# Patient Record
Sex: MALE
[De-identification: names, ages, dates, MRNs, and addresses within clinical notes are randomized per-mention and may not be internally consistent; named-entity substitution may affect disease eponyms.]

---

## 2018-07-15 ENCOUNTER — HOSPITAL ENCOUNTER (INPATIENT)
Dept: HOSPITAL 31 - C.ER | Age: 18
LOS: 2 days | Discharge: HOME | DRG: 883 | End: 2018-07-17
Attending: SURGERY | Admitting: SURGERY
Payer: COMMERCIAL

## 2018-07-15 VITALS — BODY MASS INDEX: 31.8 KG/M2

## 2018-07-15 DIAGNOSIS — K35.80: Primary | ICD-10-CM

## 2018-07-15 DIAGNOSIS — J45.909: ICD-10-CM

## 2018-07-15 LAB
BASOPHILS # BLD AUTO: 0 K/UL (ref 0–0.2)
BASOPHILS NFR BLD: 0.3 % (ref 0–2)
BILIRUB UR-MCNC: NEGATIVE MG/DL
BUN SERPL-MCNC: 14 MG/DL (ref 9–20)
CALCIUM SERPL-MCNC: 9.4 MG/DL (ref 8.6–10.4)
EOSINOPHIL # BLD AUTO: 0.3 K/UL (ref 0–0.7)
EOSINOPHIL NFR BLD: 2.3 % (ref 0–4)
ERYTHROCYTE [DISTWIDTH] IN BLOOD BY AUTOMATED COUNT: 15.1 % (ref 11.5–14.5)
GFR NON-AFRICAN AMERICAN: > 60
GLUCOSE UR STRIP-MCNC: NORMAL MG/DL
HGB BLD-MCNC: 13.2 G/DL (ref 12–18)
LEUKOCYTE ESTERASE UR-ACNC: (no result) LEU/UL
LYMPHOCYTES # BLD AUTO: 2.2 K/UL (ref 1–4.3)
LYMPHOCYTES NFR BLD AUTO: 16.1 % (ref 20–40)
MCH RBC QN AUTO: 22 PG (ref 27–31)
MCHC RBC AUTO-ENTMCNC: 31.9 G/DL (ref 33–37)
MCV RBC AUTO: 68.9 FL (ref 80–94)
MONOCYTES # BLD: 0.9 K/UL (ref 0–0.8)
MONOCYTES NFR BLD: 6.8 % (ref 0–10)
NEUTROPHILS # BLD: 10.3 K/UL (ref 1.8–7)
NEUTROPHILS NFR BLD AUTO: 74.5 % (ref 50–75)
NRBC BLD AUTO-RTO: 0.3 % (ref 0–2)
PH UR STRIP: 5 [PH] (ref 5–8)
PLATELET # BLD: 190 K/UL (ref 130–400)
PMV BLD AUTO: 8.4 FL (ref 7.2–11.7)
PROT UR STRIP-MCNC: NEGATIVE MG/DL
RBC # BLD AUTO: 6 MIL/UL (ref 4.4–5.9)
RBC # UR STRIP: NEGATIVE /UL
SP GR UR STRIP: 1.03 (ref 1–1.03)
SQUAMOUS EPITHIAL: < 1 /HPF (ref 0–5)
UROBILINOGEN UR-MCNC: NORMAL MG/DL (ref 0.2–1)
WBC # BLD AUTO: 13.8 K/UL (ref 4.8–10.8)

## 2018-07-15 NOTE — CP.PCM.HP
History of Present Illness





- History of Present Illness


History of Present Illness: 





18M with PMH of asthma presents to [facility] ED with complaints of abdominal pain. 

Patient states pain began in the morning after waking up. Patient describes 

initially abdominal pain was generalized but as time went on the pain began to 

localize towards RLQ. He states pain is sharp in nature and constant. Patient 

denies fever/chills, chest pain/SOB, n/v/d, dysuria.





PMH: as stated above


PSH: none


All: NKDA


Fam Hx: non-contributory   





Present on Admission





- Present on Admission


Any Indicators Present on Admission: No





Review of Systems





- Review of Systems


Review of Systems: 





12pt ROS unremarkable except as stated in HPI 





Past Patient History





- Past Social History


Smoking Status: Never Smoked





- PULMONARY


Hx Asthma: Yes





- PSYCHIATRIC


Hx Substance Use: No





Meds


Allergies/Adverse Reactions: 


 Allergies











Allergy/AdvReac Type Severity Reaction Status Date / Time


 


No Known Allergies Allergy   Verified 07/15/18 16:34














Physical Exam





- Constitutional


Appears: No Acute Distress





- Head Exam


Head Exam: NORMOCEPHALIC





- Eye Exam


Eye Exam: EOMI, Normal appearance





- ENT Exam


ENT Exam: Mucous Membranes Moist





- Respiratory Exam


Respiratory Exam: NORMAL BREATHING PATTERN





- Cardiovascular Exam


Cardiovascular Exam: +S1, +S2





- GI/Abdominal Exam


GI & Abdominal Exam: Rebound, Soft, Tenderness.  absent: Distended, Firm, 

Guarding, Rigid


Additional comments: 





+McBurney's


+RLQ tenderness


+rebound tenderness





- Neurological Exam


Neurological exam: Alert, Oriented x3





- Psychiatric Exam


Psychiatric exam: Normal Mood





- Skin


Skin Exam: Dry, Intact, Warm





Results





- Vital Signs


Recent Vital Signs: 





 Last Vital Signs











Temp  99.4 F   07/15/18 16:37


 


Pulse  64   07/15/18 22:04


 


Resp  12   07/15/18 22:04


 


BP  142/76   07/15/18 22:04


 


Pulse Ox  100   07/15/18 22:04














- Labs


Result Diagrams: 


 07/15/18 18:40





 07/15/18 18:40


Labs: 





 Laboratory Results - last 24 hr











  07/15/18 07/15/18 07/15/18





  18:40 18:40 18:40


 


WBC  13.8 H  


 


RBC  6.00 H  


 


Hgb  13.2  


 


Hct  41.3  


 


MCV  68.9 L  


 


MCH  22.0 L  


 


MCHC  31.9 L  


 


RDW  15.1 H  


 


Plt Count  190  


 


MPV  8.4  


 


Neut % (Auto)  74.5  


 


Lymph % (Auto)  16.1 L  


 


Mono % (Auto)  6.8  


 


Eos % (Auto)  2.3  


 


Baso % (Auto)  0.3  


 


Neut # (Auto)  10.3 H  


 


Lymph # (Auto)  2.2  


 


Mono # (Auto)  0.9 H  


 


Eos # (Auto)  0.3  


 


Baso # (Auto)  0.0  


 


Sodium    141


 


Potassium    4.0


 


Chloride    103


 


Carbon Dioxide    28


 


Anion Gap    14


 


BUN    14


 


Creatinine    0.8


 


Est GFR ( Amer)    > 60


 


Est GFR (Non-Af Amer)    > 60


 


Random Glucose    80


 


Calcium    9.4


 


Urine Color   Yellow 


 


Urine Clarity   Clear 


 


Urine pH   5.0 


 


Ur Specific Gravity   1.026 


 


Urine Protein   Negative 


 


Urine Glucose (UA)   Normal 


 


Urine Ketones   Negative 


 


Urine Blood   Negative 


 


Urine Nitrate   Negative 


 


Urine Bilirubin   Negative 


 


Urine Urobilinogen   Normal 


 


Ur Leukocyte Esterase   Neg 


 


Urine WBC (Auto)   1 


 


Urine RBC (Auto)   1 


 


Ur Squamous Epith Cells   < 1 














Assessment & Plan





- Assessment and Plan (Free Text)


Assessment: 





18M with acute appendicitis 


Plan: 





-NPO


-IVF


-ABx


-Anti-emetics/Analgesics


-SCDs


-For OR tomorrow for laparoscopic appendectomy


-Consent in chart


-F/u AM labs 


-D/w Dr. Elyssa Cristobal PGY3

## 2018-07-15 NOTE — C.PDOC
History Of Present Illness





<Nahomy Velasquez - Last Filed: 07/15/18 18:52>





<Rafywant,José Luis - Last Filed: 07/17/18 16:09>


18 year old male presents to the ED complaining of localized right lower pain 

since yesterday. Patient denies any fever, nausea, vomiting, diarrhea, or 

urinary symptoms. He denies any past surgical history.











RLQ PAIN SINCE YEST. LOCALIZED. NO FEVER NVD, UTI SX. PSH NEG





EXAM


NONTOXIC


ABD +RLQ TEND MILD SOFT NO R/G


REMAINDER NEG (Ron,Nahomy)


History Per: Patient


History/Exam Limitations: no limitations


Onset/Duration Of Symptoms: Days


Current Symptoms Are (Timing): Still Present


Location Of Pain/Discomfort: RLQ


Associated Symptoms: denies: Fever, Nausea, Vomiting, Diarrhea





<Nahomy Velasquez - Last Filed: 07/15/18 18:52>





<Rafyelis,José Luis - Last Filed: 07/17/18 16:09>


Time Seen by Provider: 07/15/18 17:55


Chief Complaint (Nursing): Abdominal Pain





Past Medical History


Reviewed: Historical Data, Nursing Documentation, Vital Signs





- Medical History


PMH: Asthma


Surgical History: No Surg Hx


Family History: States: No Known Family Hx





- Social History


Hx Alcohol Use: Yes


Hx Substance Use: No





- Immunization History


Hx Tetanus Toxoid Vaccination: No


Hx Influenza Vaccination: No


Hx Pneumococcal Vaccination: No





<Nahomy Velasquez - Last Filed: 07/15/18 18:52>


Vital Signs: 


 Last Vital Signs











Temp  98 F   07/17/18 07:30


 


Pulse  64   07/17/18 07:30


 


Resp  20   07/17/18 07:30


 


BP  122/78   07/17/18 07:30


 


Pulse Ox  97   07/17/18 07:30














Review Of Systems


Except As Marked, All Systems Reviewed And Found Negative.


Constitutional: Negative for: Fever, Chills


Gastrointestinal: Positive for: Abdominal Pain (RLQ ).  Negative for: Nausea, 

Vomiting, Diarrhea


Genitourinary: Negative for: Dysuria, Frequency, Incontinence, Hematuria





<Nahomy Velasquez - Last Filed: 07/15/18 18:52>





Physical Exam





- Physical Exam


Appears: Non-toxic, No Acute Distress


Skin: Normal Color, Warm, Dry


Head: Atraumatic, Normacephalic


Eye(s): bilateral: Normal Inspection


Nose: Normal


Oral Mucosa: Moist


Neck: Supple


Cardiovascular: Rhythm Regular


Respiratory: Other (NARD)


Gastrointestinal/Abdominal: Soft, Tenderness (+RLQ mild tenderness ), No 

Guarding, No Rebound





<Nahomy Velasquez - Last Filed: 07/15/18 18:52>





ED Course And Treatment


O2 Sat by Pulse Oximetry: 97 (RA)


Pulse Ox Interpretation: Normal


Progress Note: CT Abd/Pel ordered. Labs ordered. Patient given Morphine 2mg IVP

, Zofran 4mg IVP and Fluids. Urine collected and sent to the lab for analysis.





<Nahomy Velasquez - Last Filed: 07/15/18 18:52>





- Laboratory Results


Result Diagrams: 


 07/17/18 07:00





 07/17/18 07:00





- CT Scan/US


  ** Ct abd/pelvis


Other Rad Studies (CT/US): Read By Radiologist, Radiology Report Reviewed


CT/US Interpretation: EXAM:  CT Abdomen and Pelvis With Intravenous Contrast.  

CLINICAL HISTORY:  18 years old, male; Pain; Abdominal pain; Other: Rlq; 

Additional info: Abd pain rlq RO appy.  TECHNIQUE:  Axial computed tomography 

images of the abdomen and pelvis with intravenous contrast. All CT.  scans at 

this facility use at least one of these dose optimization techniques: automated 

exposure.  control; mA and/or kV adjustment per patient size (includes targeted 

exams where dose is matched to.  clinical indication); or iterative 

reconstruction.  CONTRAST:  100 mL of Visipaque 320 administered intravenously.

  COMPARISON:  No relevant prior studies available.  FINDINGS:  Lung bases: 

Unremarkable. No mass. No consolidation.  ABDOMEN:  Liver: Unremarkable. No 

mass.  Gallbladder and bile ducts: Unremarkable. No calcified stones. No ductal 

dilation. No significant.  wall thickening.  Pancreas: Unremarkable. No mass. 

No ductal dilation.  Spleen: Unremarkable. No splenomegaly.  Adrenals: 

Unremarkable. No mass.  Kidneys and ureters: Unremarkable. No solid mass. No 

hydronephrosis.  Stomach and bowel: Unremarkable. No obstruction. No mucosal 

thickening.  PELVIS:  Appendix: The appendix demonstrates mild diffuse 

distention, consistent with mild or early acute.  appendicitis. 10 mm appendix 

diameter. There is mild periappendiceal inflammatory stranding.  SLIME FRAGA 

Accession: [id number] MRN: [id number] | Preliminary Radiology Report.  

CONFIDENTIALITY STATEMENT.  This report is intended only for the use of the 

referring physician, and only in accordance with law, If you received this in 

error, call [phone].  Page 2 of 2.  Bladder: Unremarkable. No mass.  

Reproductive: Unremarkable as visualized.  ABDOMEN and PELVIS:  Intraperitoneal 

space: Unremarkable. No free air. No significant fluid collection.  Bones/joints

: No acute fracture. No dislocation.  Soft tissues: Unremarkable.  Vasculature: 

Unremarkable. No abdominal aortic aneurysm.  Lymph nodes: Unremarkable. No 

enlarged lymph nodes.  IMPRESSION:  Acute early appendicitis, without 

complications.  Thank you for allowing us to participate in the care of your 

patient.  Dictated and Authenticated by: Tang Pichardo MD.  07/15/2018 10:31 

PM Eastern Time ([place] & [place])





<José Luis Bowden - Last Filed: 07/17/18 16:09>





Medical Decision Making





<Nahomy Velasquez - Last Filed: 07/15/18 18:52>





<José Luis Bowden - Last Filed: 07/17/18 16:09>


Medical Decision Making: 





case endorsed pending ct ct shows appendcitis. accepted by [facility].  (José Luis Bowden)





Disposition


Counseled Patient/Family Regarding: Diagnosis





- Disposition


Disposition Time: 19:00





<Nahomy Velasquez - Last Filed: 07/15/18 18:52>





<José Luis Bowden - Last Filed: 07/17/18 16:09>





- Disposition


Disposition: HOSPITALIZED


Condition: GOOD





- Clinical Impression


Clinical Impression: 


 Abdominal pain, Acute appendicitis








Physician Patient Turnover


Patient Signed Over To: José Luis Bowden


Handoff Comments: FU LABS, CT, DISPO





<Nahomy Velasquez - Last Filed: 07/15/18 18:52>

## 2018-07-16 LAB
APTT BLD: 33 SECONDS (ref 21–34)
BASOPHILS # BLD AUTO: 0 K/UL (ref 0–0.2)
BASOPHILS NFR BLD: 0.2 % (ref 0–2)
BUN SERPL-MCNC: 12 MG/DL (ref 9–20)
CALCIUM SERPL-MCNC: 9 MG/DL (ref 8.6–10.4)
EOSINOPHIL # BLD AUTO: 0.4 K/UL (ref 0–0.7)
EOSINOPHIL NFR BLD: 4 % (ref 0–4)
ERYTHROCYTE [DISTWIDTH] IN BLOOD BY AUTOMATED COUNT: 15.1 % (ref 11.5–14.5)
GFR NON-AFRICAN AMERICAN: > 60
HGB BLD-MCNC: 13.1 G/DL (ref 12–18)
INR PPP: 1.1
LYMPHOCYTES # BLD AUTO: 2.4 K/UL (ref 1–4.3)
LYMPHOCYTES NFR BLD AUTO: 26.8 % (ref 20–40)
MCH RBC QN AUTO: 22.3 PG (ref 27–31)
MCHC RBC AUTO-ENTMCNC: 32.3 G/DL (ref 33–37)
MCV RBC AUTO: 69 FL (ref 80–94)
MONOCYTES # BLD: 0.7 K/UL (ref 0–0.8)
MONOCYTES NFR BLD: 7.6 % (ref 0–10)
NEUTROPHILS # BLD: 5.6 K/UL (ref 1.8–7)
NEUTROPHILS NFR BLD AUTO: 61.4 % (ref 50–75)
NRBC BLD AUTO-RTO: 0.1 % (ref 0–2)
PLATELET # BLD: 190 K/UL (ref 130–400)
PMV BLD AUTO: 8.3 FL (ref 7.2–11.7)
PROTHROMBIN TIME: 11.8 SECONDS (ref 9.7–12.2)
RBC # BLD AUTO: 5.89 MIL/UL (ref 4.4–5.9)
WBC # BLD AUTO: 9.1 K/UL (ref 4.8–10.8)

## 2018-07-16 PROCEDURE — 0DTJ4ZZ RESECTION OF APPENDIX, PERCUTANEOUS ENDOSCOPIC APPROACH: ICD-10-PCS | Performed by: SURGERY

## 2018-07-16 RX ADMIN — BUPIVACAINE HYDROCHLORIDE ONE ML: 2.5 INJECTION, SOLUTION INFILTRATION; PERINEURAL at 10:01

## 2018-07-16 RX ADMIN — LIDOCAINE HYDROCHLORIDE AND EPINEPHRINE ONE ML: 10; 10 INJECTION, SOLUTION INFILTRATION; PERINEURAL at 09:58

## 2018-07-16 RX ADMIN — BUPIVACAINE HYDROCHLORIDE ONE ML: 2.5 INJECTION, SOLUTION INFILTRATION; PERINEURAL at 09:58

## 2018-07-16 RX ADMIN — LIDOCAINE HYDROCHLORIDE AND EPINEPHRINE ONE ML: 10; 10 INJECTION, SOLUTION INFILTRATION; PERINEURAL at 10:01

## 2018-07-16 RX ADMIN — TAZOBACTAM SODIUM AND PIPERACILLIN SODIUM SCH MLS/HR: 375; 3 INJECTION, SOLUTION INTRAVENOUS at 03:38

## 2018-07-16 RX ADMIN — TAZOBACTAM SODIUM AND PIPERACILLIN SODIUM SCH MLS/HR: 375; 3 INJECTION, SOLUTION INTRAVENOUS at 16:46

## 2018-07-16 RX ADMIN — TAZOBACTAM SODIUM AND PIPERACILLIN SODIUM SCH: 375; 3 INJECTION, SOLUTION INTRAVENOUS at 09:19

## 2018-07-16 RX ADMIN — TAZOBACTAM SODIUM AND PIPERACILLIN SODIUM SCH MLS/HR: 375; 3 INJECTION, SOLUTION INTRAVENOUS at 21:37

## 2018-07-16 NOTE — RAD
Date of service: 



07/15/2018



HISTORY:

preop  



COMPARISON:

No prior.



TECHNIQUE:

Chest PA and lateral



FINDINGS:



LUNGS:

No active pulmonary disease.



PLEURA:

No significant pleural effusion identified. No pneumothorax apparent.



CARDIOVASCULAR:

Normal.



OSSEOUS STRUCTURES:

No significant abnormalities.



VISUALIZED UPPER ABDOMEN:

Normal.



OTHER FINDINGS:

None.



IMPRESSION:

No active disease.

## 2018-07-16 NOTE — OP
PROCEDURE DATE:  07/16/2018



PREOPERATIVE DIAGNOSIS:  Acute appendicitis.



POSTOPERATIVE DIAGNOSIS:  Acute appendicitis.



PROCEDURE DONE:  Laparoscopic appendectomy.



SURGEON:  Dilan Bergeron MD



ASSISTANT:  Chan Muñoz DO, PGY-2 resident.



ANESTHESIA:  General endotracheal tube anesthesia.



ESTIMATED BLOOD LOSS:  Around 10 mL.



DRAINS:  None.



PATHOLOGY:  The appendix was sent to the pathology.



COMPLICATIONS:  None.



INTRAOPERATIVE FINDINGS:  The patient had changes of acute appendicitis. 

There was no pelvic abscess.



DESCRIPTION OF PROCEDURE:  On intraoperative steps, this 18-year-old male

was diagnosed with acute appendicitis.  The patient presented for

laparoscopic appendectomy, possible open.  Brought to the OR, placed supine

on operating table.  After induction of the anesthesia, the abdomen was

prepped and draped in the usual sterile fashion.  A supraumbilical

transverse incision was made after incising the skin, subcutaneous tissue,

and the fascia.  The Vivien port was placed and pneumo was created.  A 5-mm

port was placed in the suprapubic region.  A 12-mm port was placed in the

left lower quadrant.  Grasper and dissector were introduced.  The appendix

appeared to be in the paracecal position.  The mesoappendix was detected. 

The base of appendix was resected with MORENA, and this was taken in an Endo

Cath bag, taken out through the umbilical port site and was sent off the

table for pathology.  There was a proper hemostasis in each and every part

of the procedure.  After proper suction and irrigation of the pelvis,

periappendicular area and perihepatic area, all the ports were taken out

under vision and pneumo was deflated.  The umbilical port site was closed

in two layers; the fascia with 0 Vicryl interrupted suture and skin with

4-0 Monocryl.  Dry sterile dressing was applied.  The patient tolerated the

procedure well.  Count of the instrument and gauze was correct.  There was

no apparent complication.  The patient was extubated in the OR and sent to

the postanesthesia care unit in stable condition.





__________________________________________

Dilan Bergeron MD





DD:  07/16/2018 11:15:52

DT:  07/16/2018 13:36:16

Job # [id number]

## 2018-07-16 NOTE — CT
Date of service: 



07/15/2018



PROCEDURE:  CT Abdomen and Pelvis without intravenous contrast



HISTORY:

Abdominal pain. 



COMPARISON:

None.



TECHNIQUE:

Multiple contiguous axial images were performed through the abdomen 

and pelvis without the use of intravenous contrast.  Subsequently, 

sagittal and coronal reformatted images were. 



Radiation dose:



Total exam DLP = 815 mGy-cm.



This CT exam was performed using one or more of the following dose 

reduction techniques: Automated exposure control, adjustment of the 

mA and/or kV according to patient size, and/or use of iterative 

reconstruction technique.



FINDINGS:



LOWER THORAX:

Unremarkable. 



LIVER:

Unremarkable. No gross lesion or ductal dilatation.  



GALLBLADDER AND BILE DUCTS:

Unremarkable. 



PANCREAS:

Unremarkable. No gross lesion or ductal dilatation.



SPLEEN:

Unremarkable. 



ADRENALS:

Unremarkable. No mass. 



KIDNEYS AND URETERS:

Unremarkable. No hydronephrosis. No solid mass. 



VASCULATURE:

Unremarkable. No aortic aneurysm. 



BOWEL:

Unremarkable. No obstruction. No gross mural thickening. 



APPENDIX:

Appendix demonstrates mild diffuse distention concerning for mild 

early acute appendicitis measuring up to 10 millimeters diameter.  

Mild periappendiceal fat stranding. 



PERITONEUM:

Unremarkable. No free fluid. No free air. 



LYMPH NODES:

Unremarkable. No enlarged lymph nodes. 



BLADDER:

Unremarkable. 



REPRODUCTIVE:

Unremarkable. 



BONES:

Sclerosis of the bilateral SI joints.  Punctate sclerotic foci within 

the right iliac bone.  Multilevel endplate sclerosis within the 

lumbar spine. Small sclerotic foci in the proximal right femur. 

Clinical correlation. 



OTHER FINDINGS:

None.



IMPRESSION:

Findings concerning for acute early appendicitis.  Clinical 

correlation.



Sclerosis of the bilateral SI joints.  Punctate sclerotic foci within 

the right iliac bone.  Multilevel endplate sclerosis within the 

lumbar spine. Small sclerotic foci in the proximal right femur. 

Clinical correlation. 



These findings were preliminarily reported at 10:31 p.m. 07/15/2018 

by Dr. Tang Pichardo from [facility].

## 2018-07-16 NOTE — PCM.SURG1
Surgeon's Initial Post Op Note





- Surgeon's Notes


Surgeon: Dr. Bergeron


Assistant: Wanda PGY2


Type of Anesthesia: General Endo


Anesthesia Administered By: Dr. Verde


Pre-Operative Diagnosis: Acute appendicitis


Operative Findings: Acute appendicitis


Post-Operative Diagnosis: Acute appendicitis


Operation Performed: Laparoscopic Appendectomy


Specimen/Specimens Removed: Appendix


Estimated Blood Loss: EBL {In ML}: 10


Blood Products Given: N/A


Drains Used: No Drains


Post-Op Condition: Good


Date of Surgery/Procedure: 07/16/18


Time of Surgery/Procedure: 11:27

## 2018-07-17 VITALS
SYSTOLIC BLOOD PRESSURE: 122 MMHG | TEMPERATURE: 98 F | OXYGEN SATURATION: 97 % | HEART RATE: 64 BPM | DIASTOLIC BLOOD PRESSURE: 78 MMHG

## 2018-07-17 VITALS — RESPIRATION RATE: 20 BRPM

## 2018-07-17 LAB
BASOPHILS # BLD AUTO: 0 K/UL (ref 0–0.2)
BASOPHILS NFR BLD: 0.2 % (ref 0–2)
BUN SERPL-MCNC: 9 MG/DL (ref 9–20)
CALCIUM SERPL-MCNC: 8.9 MG/DL (ref 8.6–10.4)
EOSINOPHIL # BLD AUTO: 0.3 K/UL (ref 0–0.7)
EOSINOPHIL NFR BLD: 3 % (ref 0–4)
ERYTHROCYTE [DISTWIDTH] IN BLOOD BY AUTOMATED COUNT: 15.2 % (ref 11.5–14.5)
GFR NON-AFRICAN AMERICAN: > 60
HGB BLD-MCNC: 13 G/DL (ref 12–18)
LYMPHOCYTES # BLD AUTO: 2.1 K/UL (ref 1–4.3)
LYMPHOCYTES NFR BLD AUTO: 24.2 % (ref 20–40)
MCH RBC QN AUTO: 22.6 PG (ref 27–31)
MCHC RBC AUTO-ENTMCNC: 32.6 G/DL (ref 33–37)
MCV RBC AUTO: 69.5 FL (ref 80–94)
MONOCYTES # BLD: 0.6 K/UL (ref 0–0.8)
MONOCYTES NFR BLD: 6.8 % (ref 0–10)
NEUTROPHILS # BLD: 5.8 K/UL (ref 1.8–7)
NEUTROPHILS NFR BLD AUTO: 65.8 % (ref 50–75)
NRBC BLD AUTO-RTO: 0 % (ref 0–2)
PLATELET # BLD: 195 K/UL (ref 130–400)
PMV BLD AUTO: 8.4 FL (ref 7.2–11.7)
RBC # BLD AUTO: 5.75 MIL/UL (ref 4.4–5.9)
WBC # BLD AUTO: 8.8 K/UL (ref 4.8–10.8)

## 2018-07-17 RX ADMIN — TAZOBACTAM SODIUM AND PIPERACILLIN SODIUM SCH MLS/HR: 375; 3 INJECTION, SOLUTION INTRAVENOUS at 09:20

## 2018-07-17 RX ADMIN — TAZOBACTAM SODIUM AND PIPERACILLIN SODIUM SCH MLS/HR: 375; 3 INJECTION, SOLUTION INTRAVENOUS at 03:33

## 2018-07-17 NOTE — CP.PCM.DIS
Provider





- Provider


Date of Admission: 


07/15/18 22:43





Attending physician: 


Dilan Bergeron MD





Time Spent in preparation of Discharge (in minutes): 40





Diagnosis





- Discharge Diagnosis


(1) Acute appendicitis


Status: Acute   





Hospital Course





- Lab Results


Lab Results: 


 Most Recent Lab Values











WBC  9.1 K/uL (4.8-10.8)   07/16/18  06:43    


 


RBC  5.89 Mil/uL (4.40-5.90)   07/16/18  06:43    


 


Hgb  13.1 g/dL (12.0-18.0)   07/16/18  06:43    


 


Hct  40.6 % (35.0-51.0)   07/16/18  06:43    


 


MCV  69.0 fL (80.0-94.0)  L  07/16/18  06:43    


 


MCH  22.3 pg (27.0-31.0)  L  07/16/18  06:43    


 


MCHC  32.3 g/dL (33.0-37.0)  L  07/16/18  06:43    


 


RDW  15.1 % (11.5-14.5)  H  07/16/18  06:43    


 


Plt Count  190 K/uL (130-400)   07/16/18  06:43    


 


MPV  8.3 fL (7.2-11.7)   07/16/18  06:43    


 


Neut % (Auto)  61.4 % (50.0-75.0)   07/16/18  06:43    


 


Lymph % (Auto)  26.8 % (20.0-40.0)   07/16/18  06:43    


 


Mono % (Auto)  7.6 % (0.0-10.0)   07/16/18  06:43    


 


Eos % (Auto)  4.0 % (0.0-4.0)   07/16/18  06:43    


 


Baso % (Auto)  0.2 % (0.0-2.0)   07/16/18  06:43    


 


Neut # (Auto)  5.6 K/uL (1.8-7.0)   07/16/18  06:43    


 


Lymph # (Auto)  2.4 K/uL (1.0-4.3)   07/16/18  06:43    


 


Mono # (Auto)  0.7 K/uL (0.0-0.8)   07/16/18  06:43    


 


Eos # (Auto)  0.4 K/uL (0.0-0.7)   07/16/18  06:43    


 


Baso # (Auto)  0.0 K/uL (0.0-0.2)   07/16/18  06:43    


 


PT  11.8 SECONDS (9.7-12.2)   07/16/18  00:54    


 


INR  1.1   07/16/18  00:54    


 


APTT  33 SECONDS (21-34)   07/16/18  00:54    


 


Sodium  141 mmol/L (132-148)   07/16/18  06:43    


 


Potassium  3.9 mmol/L (3.6-5.2)   07/16/18  06:43    


 


Chloride  106 mmol/L ()   07/16/18  06:43    


 


Carbon Dioxide  25 mmol/L (22-30)   07/16/18  06:43    


 


Anion Gap  14  (10-20)   07/16/18  06:43    


 


BUN  12 mg/dL (9-20)   07/16/18  06:43    


 


Creatinine  0.8 mg/dL (0.8-1.5)   07/16/18  06:43    


 


Est GFR ( Amer)  > 60   07/16/18  06:43    


 


Est GFR (Non-Af Amer)  > 60   07/16/18  06:43    


 


POC Glucose (mg/dL)  119 mg/dL ()  H  07/16/18  20:55    


 


Random Glucose  80 mg/dL ()   07/16/18  06:43    


 


Calcium  9.0 mg/dl (8.6-10.4)   07/16/18  06:43    


 


Urine Color  Yellow  (YELLOW)   07/15/18  18:40    


 


Urine Clarity  Clear  (Clear)   07/15/18  18:40    


 


Urine pH  5.0  (5.0-8.0)   07/15/18  18:40    


 


Ur Specific Gravity  1.026  (1.003-1.030)   07/15/18  18:40    


 


Urine Protein  Negative mg/dL (NEGATIVE)   07/15/18  18:40    


 


Urine Glucose (UA)  Normal mg/dL (Normal)   07/15/18  18:40    


 


Urine Ketones  Negative mg/dL (NEGATIVE)   07/15/18  18:40    


 


Urine Blood  Negative  (NEGATIVE)   07/15/18  18:40    


 


Urine Nitrate  Negative  (NEGATIVE)   07/15/18  18:40    


 


Urine Bilirubin  Negative  (NEGATIVE)   07/15/18  18:40    


 


Urine Urobilinogen  Normal mg/dL (0.2-1.0)   07/15/18  18:40    


 


Ur Leukocyte Esterase  Neg Raghavendra/uL (Negative)   07/15/18  18:40    


 


Urine WBC (Auto)  1 /hpf (0-5)   07/15/18  18:40    


 


Urine RBC (Auto)  1 /hpf (0-3)   07/15/18  18:40    


 


Ur Squamous Epith Cells  < 1 /hpf (0-5)   07/15/18  18:40    


 


Blood Type  O POSITIVE   07/16/18  01:04    


 


Antibody Screen  Negative   07/16/18  01:04    














- Hospital Course


Hospital Course: 





18M with PMH of asthma presents to [facility] [place] ED with complaints of abdominal pain. 

Patient stated pain began in the morning after waking up. Patient described 

abdominal pain was generalized intially but then migrated towards and localized 

in RLQ. He rated pain as severe. He stated pain is sharp in nature and 

constant. Patient denied fever/chills, chest pain/SOB, n/v/d, dysuria. Patient 

was admitted to surgery for acute appendicitis. He was made NPO and placed on 

IV fluids. IV antibiotics were started. Analgesics and Anti-emetics were also 

provided as needed. The following day, patient went to the OR for laparoscopic 

appendectomy. Patient tolerated procedure well without complications. Patient 

was started on regular diet afterwards. Patient remained in hospital overnight 

due to pain control and continued antibiotics. On 7/17, patient was deemed 

stable for discharge by Dr. Bergeron. Patient is to follow up as outpatient 

with Dr. Bergeron within 1-2 weeks.





(This is a summary of the hospital course. Please refer to EMR for more details.

)





- Date & Time of H&P


Date of H&P: 07/15/18


Time of H&P: 23:31





Discharge Exam





- Head Exam


Head Exam: ATRAUMATIC, NORMOCEPHALIC





- Eye Exam


Eye Exam: EOMI, Normal appearance


Pupil Exam: PERRL





- ENT Exam


ENT Exam: Mucous Membranes Moist





- Respiratory Exam


Respiratory Exam: NORMAL BREATHING PATTERN





- Cardiovascular Exam


Cardiovascular Exam: REGULAR RHYTHM





- GI/Abdominal Exam


GI & Abdominal Exam: Normal Bowel Sounds, Soft, Tenderness (minmal at surgical 

sites).  absent: Distended, Firm, Guarding, Rebound, Rigid


Additional comments: 





dressings clean dry and intact





- Extremities Exam


Extremities exam: normal capillary refill, pedal pulses present





- Back Exam


Back exam: absent: CVA tenderness (L), CVA tenderness (R)





- Neurological Exam


Neurological exam: Alert, CN II-XII Intact, Oriented x3





- Psychiatric Exam


Psychiatric exam: Normal Affect, Normal Mood





- Skin


Skin Exam: Dry, Intact, Normal Color, Warm





Discharge Plan





- Follow Up Plan


Condition: GOOD


Disposition: HOME/ ROUTINE


Additional Instructions: 


Take perocet and colace as prescribed


Keep bandage on for 5 days


No heavy lifting for 4 weeks


Patient to sponge until follow up with Dr. Bergeron


Keep surgical areas clean and dry


Follow up with Dr. Bergeron within 1-2 weeks


Referrals: 


Dilan Bergeron MD [Staff Provider] -